# Patient Record
Sex: MALE | Race: OTHER | HISPANIC OR LATINO | URBAN - METROPOLITAN AREA
[De-identification: names, ages, dates, MRNs, and addresses within clinical notes are randomized per-mention and may not be internally consistent; named-entity substitution may affect disease eponyms.]

---

## 2024-02-15 ENCOUNTER — EMERGENCY (EMERGENCY)
Facility: HOSPITAL | Age: 39
LOS: 1 days | Discharge: HOME | End: 2024-02-15
Admitting: EMERGENCY MEDICINE
Payer: MEDICAID

## 2024-02-15 VITALS
SYSTOLIC BLOOD PRESSURE: 129 MMHG | RESPIRATION RATE: 16 BRPM | WEIGHT: 125.66 LBS | DIASTOLIC BLOOD PRESSURE: 80 MMHG | OXYGEN SATURATION: 98 % | TEMPERATURE: 98 F | HEIGHT: 70.47 IN | HEART RATE: 84 BPM

## 2024-02-15 DIAGNOSIS — S69.91XA UNSPECIFIED INJURY OF RIGHT WRIST, HAND AND FINGER(S), INITIAL ENCOUNTER: ICD-10-CM
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
## 2024-02-15 DIAGNOSIS — Z59.01 SHELTERED HOMELESSNESS: ICD-10-CM

## 2024-02-15 PROCEDURE — 73140 X-RAY EXAM OF FINGER(S): CPT | Mod: 26,RT

## 2024-02-15 PROCEDURE — 73140 X-RAY EXAM OF FINGER(S): CPT

## 2024-02-15 PROCEDURE — 99284 EMERGENCY DEPT VISIT MOD MDM: CPT

## 2024-02-15 PROCEDURE — 99283 EMERGENCY DEPT VISIT LOW MDM: CPT | Mod: 25

## 2024-02-15 RX ORDER — IBUPROFEN 200 MG
600 TABLET ORAL ONCE
Refills: 0 | Status: COMPLETED | OUTPATIENT
Start: 2024-02-15 | End: 2024-02-15

## 2024-02-15 RX ADMIN — Medication 600 MILLIGRAM(S): at 19:05

## 2024-02-15 SDOH — ECONOMIC STABILITY - HOUSING INSECURITY: SHELTERED HOMELESSNESS: Z59.01

## 2024-02-15 NOTE — ED PROVIDER NOTE - NSFOLLOWUPINSTRUCTIONS_ED_ALL_ED_FT
There is no fracture on xray. You have a finger sprain. Please wear the splint for comfort and you can remove once your pain improves.    Take ibuprofen 600mg up to three times daily for pain. Apply ice packs to reduce pain and swelling.    If you continue to have pain after 5-7 days, please be reevaluated by your primary care doctor.    Call the number above to follow up or if your insurance is not accepted, call one of the numbers below for an appointment.    [facility] - [email or web address]  * Most health insurance plans accepted / sliding fee scale for the uninsured  - [address] [facility] 6th [facility] [address] [phone]  - [address] [place] [address] [phone]  - The facility offers the following services - medical care, dental, neuropsychology, speech therapy, physical therapy, addiction recovery, psychiatry, podiatry, social work, occupational therapy, STD/HIV screening and treatment    [facility] - [email or web address]  *Care for the whole family. We never turn anyone away.  - [facility] [address], [place] [address] [phone]  - [facility] [address] [place] [id number] [phone]  - [address] [id number] [phone]    [facility]  -  and [address] [place] [address].  - You may seek assistance with your Medicaid coverage in their Medicaid Office and you may obtain a Clinic Card which will give you access to [facility]'s primary care and specialty clinics.  - You can make those appointments on site or call [phone] to make an appointment.    CRISIS INTERVENTION  [facility] Crisis Intervention Program provides same-day, walk-in help for [facility] who are homeless or at risk of homelessness. No appointment necessary. Our counselors and advocates are available Monday through Friday at 9am, with sign-up starting earlier. We provide assistance and advocacy with problems at shelters, intake centers (PATH, AFIC, etc), benefits (Welfare, Food [place], SSI/SSD, Medicaid, etc), eviction prevention, establishing a mailing address, and applications for housing assistance. We also provide referrals to shelter, clothing, food pantries, legal services, counseling, detox and addiction treatment, job training, mental health services, domestic violence counseling, assistance with ID, and other services.      [facility]  [address] [facility], located in [facility]  Mon, Tues, & Thurs: We see the first 50 clients  Wed & Fri: We see the first 30 clients  Return carfare provided.  Directions: A, C, 2, 3, 4, 5, J, M or Z train to [facility]      STREET OUTREACH - [address] [place] [address] partners with nonprofits to provide outreach, offer support services, and encourage people living on the streets to enter into shelter. If you are street homeless or you know the location of someone who might benefit from a street outreach team, please call 311.      PRADEEP FELICIANO    [place]   • [facility] • [address] • [phone] • MIKE Steen, W: 7-8:30am; M-F: 5:30-7pm; Sat: 9:30-11am • [address] to [address] • No referral needed  • [facility] [place] • [address] • [phone] • M-Sun: 6:30-7am; 5-6:30pm  • [address] • No referral needed  • [facility] • [address] • [phone] • Th & F: 6-7:30am • [address] to [address] • No referral needed  • [facility] • [address] • [phone] • Daily: 6:30-7:30am;12:30-1:30pm; 4:30-6pm • [address] to [address] • No referral needed  • [facility] • [address] • [phone] • M-F: 10:30am-12:30pm • Counseling 9:30am-12:30pm • C/E to [address] • No referral needed  • [facility] • [address] ([address]) • [phone] • Tu-F: 12-2pm • Sometimes pantry is available • F to [address], [address], N/R to [address] • Women only  • [facility] • [address] ([address]) • [phone] • M-Sun: 7am (show up at 6:30am) • B/D/F/M/N/Q/R to [address] • No referral needed  • [facility] • [address] • [phone] • M-F: 10-11:30am • F to [address], [address] to [address] • No referral needed  • [facility] • [address] ([address]) • [phone] • Tu & Th: 1-2pm • A/C/E to [address], N/R to [address] • No referral needed  • [facility] • [address] • [phone] • M, W, F: 7:30-9am • C to [address] • No referral needed  • [facility] • [address] • [phone] • M-F: 8-9:15am; M, W, F: 4:30-6pm; Bags on Sat, Sun, Holidays 4-5pm • A/C/2/3 to [address] or [address] to [address] • No referral needed    [facility] [place]   • [facility] [place] • [address] • [phone] • M, W, F: 5-6:30pm • [address] to [address]• No referral needed  • [facility] • [address] [place] • [phone] • M, W, F: 1-2pm • 2/[address] to [address] • No referral needed  • [facility] • [address] • [phone] • Tu & F: 11am-1pm • B/D to [address] • No referral needed  • [facility] • [address] • [phone] • Every day: 12:30-3:30pm, Pantry: 9am-12pm • D to [address]/Luc[address], 4 to [address]    -----------------------------------------------------  No hay fractura en la radiografía. Tienes un esguince en el dedo. Utilice la férula para falcon comodidad y podrá quitársela oswaldo vez que mejore el dolor.    [facility] [place] 600 mg de ibuprofeno hasta tavon veces al día para el dolor. Aplique compresas de hielo para reducir el dolor y la hinchazón.    Si continúa teniendo dolor después de 5 a 7 días, sea reevaluado por falcon médico de atención primaria.    Llame al número que aparece arriba para hacer un seguimiento o, si falcon seguro no es aceptado, llame a jose de los números que aparecen a continuación para programar oswaldo abbey.    [facility] - [email or web address]  * Se aceptan la mayoría de los planes de seguro médico / escala móvil de tarifas para personas sin seguro  - [address] [facility] [id number] [phone]  - [address] [id number] [phone]  - El centro ofrece los siguientes servicios: atención médica, dental, neuropsicología, logopedia, fisioterapia, recuperación de adicciones, psiquiatría, podología, trabajo social, terapia ocupacional, detección y tratamiento de ETS/VIH.    [facility] de atención sanitaria - www.[email or web address]  *Cuidar a toda la sylvia. Nunca rechazamos a nadana.  - Cent[facility] del [facility] [address] [place] [address] [phone]  - [facility] [address] [place] [address] [phone]  - Centro de [facility]rose de la [facility] [address] [place] [address] [phone]    Clínicas del [facility]  - [place] [address]  - Puede buscar ayuda con falcon cobertura de Medicaid en falcon oficina de Medicaid y puede obtener oswaldo [place] que le dará acceso a las clínicas de atención primaria y especializadas de [facility].  - Puede programar esas citas en el sitio o llamar al [phone] para programar oswaldo abbey.    INTERVENCION DE CRISIS  La Coalición para el Programa de Intervención en Crisis para Personas sin Hogar mundo ayuda sin abbey previa el mismo día para neoyorquinos sin hogar o en riesgo de quedarse sin hogar. No es necesaria abbey previa. Nuestros consejeros y defensores están disponibles de lunes a viernes a las 9 a. m. y la inscripción comienza antes. Brindamos asistencia y defensa con problemas en refugios, centros de admisión (PATH, AFIC, etc.), beneficios (bienestar social, cupones para alimentos, SSI/SSD, Medicaid, etc.), prevención de desalojo, establecimiento de oswaldo dirección postal y solicitudes de asistencia para vivienda. También brindamos referencias a thor, ropa, despensas de alimentos, servicios legales, asesoramiento, tratamiento de desintoxicación y adicciones, capacitación laboral, servicios de yvrose mental, asesoramiento sobre violencia doméstica, asistencia con identificación y otros servicios.      La coalición para las personas sin hogar  [address] [place] [address]  [place] [facility], ubicada en el [place]  Maricel, camilo y jueves: atendemos a los primeros 50 clientes  Miércoles y viernes: vemos a los primeros 30 clientes.  Se proporciona transporte de regreso.  Indicaciones: tren A, C, 2, 3, 4, 5, J, M o Z hasta la estación [facility]

## 2024-02-15 NOTE — ED PROVIDER NOTE - PATIENT PORTAL LINK FT
THYROID ASSESSMENT    Visit Vitals  /62 (BP Location: LUE - Left upper extremity, Patient Position: Sitting, Cuff Size: Large Adult)   Pulse 92   Resp 16   Ht 5' 11\" (1.803 m)   Wt 123 kg   BMI 37.82 kg/m²     No LMP for male patient.  Recent Weight Change: None Tremors: None Heat/Cold Intolerance: None   Energy: No Energy Bowel: None Difficulty Swallowing: Sometimes and has the feeling something is blocking throat Palpitations: Yes  Hoarseness: None Skin / Hair: None Present Thyroid Medication: Levo 125 mcg       You can access the FollowMyHealth Patient Portal offered by [facility] by registering at the following website: [email or web address]. By joining [employer]s FollowMyHealth portal, you will also be able to view your health information using other applications (apps) compatible with our system.
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
## 2024-02-15 NOTE — ED PROVIDER NOTE - CLINICAL SUMMARY MEDICAL DECISION MAKING FREE TEXT BOX
Pt is afebrile and hemodynamically stable. concern for fracture vs contusion. XR finger without evidence of bony injury. Discussed supportive care with pt. Placed in splint for comfort. Return precautions given.

## 2024-02-15 NOTE — ED ADULT NURSE NOTE - NSFALLUNIVINTERV_ED_ALL_ED
Bed/Stretcher in lowest position, wheels locked, appropriate side rails in place/Call bell, personal items and telephone in reach/Instruct patient to call for assistance before getting out of bed/chair/stretcher/Non-slip footwear applied when patient is off stretcher/[place] to call system/Physically safe environment - no spills, clutter or unnecessary equipment/Purposeful proactive rounding/Room/bathroom lighting operational, light cord in reach
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
## 2024-02-15 NOTE — ED PROVIDER NOTE - PHYSICAL EXAMINATION
VITAL SIGNS: I have reviewed nursing notes and confirm.  CONSTITUTIONAL: Well-developed; well-nourished; in no acute distress.   SKIN:  warm and dry, no acute rash.   HEAD:  normocephalic, atraumatic.  EYES: PERRL, EOM intact; conjunctiva and sclera clear.  ENT: No nasal discharge; airway clear.   NECK: Supple; non tender.  EXT: Normal ROM. No clubbing, cyanosis or edema. 2+ pulses to b/l ue/le.  NEURO: Alert, oriented, grossly unremarkable  PSYCH: Cooperative, mood and affect appropriate.    R hand: 4th digit with swelling and tenderness at the PIP and DIP joints. He has FROM with pain. Cap refill <2 sec. Distal sensation intact.
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
